# Patient Record
(demographics unavailable — no encounter records)

---

## 2024-10-08 NOTE — PHYSICAL EXAM
[Normal Appearance] : normal appearance [General Appearance - In No Acute Distress] : no acute distress [Heart Rate And Rhythm] : heart rate and rhythm were normal [] : no respiratory distress [Abdomen Soft] : soft [Normal Station and Gait] : the gait and station were normal for the patient's age [Skin Color & Pigmentation] : normal skin color and pigmentation [No Focal Deficits] : no focal deficits [Oriented To Time, Place, And Person] : oriented to person, place, and time [Not Anxious] : not anxious [de-identified] : left epididymal head cyst, nontender

## 2024-10-08 NOTE — PHYSICAL EXAM
[Normal Appearance] : normal appearance [General Appearance - In No Acute Distress] : no acute distress [Heart Rate And Rhythm] : heart rate and rhythm were normal [] : no respiratory distress [Abdomen Soft] : soft [Normal Station and Gait] : the gait and station were normal for the patient's age [Skin Color & Pigmentation] : normal skin color and pigmentation [No Focal Deficits] : no focal deficits [Oriented To Time, Place, And Person] : oriented to person, place, and time [Not Anxious] : not anxious [de-identified] : left epididymal head cyst, nontender

## 2024-10-08 NOTE — HISTORY OF PRESENT ILLNESS
[FreeTextEntry1] : 9/4/24: 52 yo male referred for 2 month hx of intermittent bladder pressure, generalized feeling of being unwell, frequency, and occasional dysuria. He was treated with 2 separate courses of cipro for 5 days each (10 days total) and felt better after the courses of abx.  He has had 2 negative Ucx during this time.  He felt well throughout August but then again last week developed similar symptoms.  He denies fevers, nausea, vomiting, nocturia, hematuria, or urgency.  He denies perineal pain.  He has been taking NSAIDs BID with minimal improvement in symptoms.  he did note improvement of symptoms with warm baths. He was restarted on a course of cipro hut has not seen any improvement in symptoms after 4 days of abx.    PVR = 61 cc (LUTS)  ************* 10/8/24: Patient returns for follow up.  He notes improvement in his prior pelvic discomfort after a month of Bactrim, but he still notes a mild discomfort.  His UA and Ucx were negative.

## 2024-10-08 NOTE — ASSESSMENT
[FreeTextEntry1] : 52 yo male with improved but still mildly present pelvic discomfort and bladder pressure.  I explained that this is likely related to pelvic floor dysfunction.  It is unlikely that after a month of Abx with negative UA and Ucx that this is of infectious etiology.  I advised him to use NSAIDs PRN and soak in warm baths.  we also discussed the utility of PFPT for PFD and I provided him with a list of PFPT locations in the the [place] area.  He will follow up as needed.

## 2024-10-08 NOTE — ASSESSMENT
[FreeTextEntry1] : 50 yo male with improved but still mildly present pelvic discomfort and bladder pressure.  I explained that this is likely related to pelvic floor dysfunction.  It is unlikely that after a month of Abx with negative UA and Ucx that this is of infectious etiology.  I advised him to use NSAIDs PRN and soak in warm baths.  we also discussed the utility of PFPT for PFD and I provided him with a list of PFPT locations in the the [place] area.  He will follow up as needed.